# Patient Record
Sex: FEMALE | Race: WHITE | Employment: OTHER | URBAN - METROPOLITAN AREA
[De-identification: names, ages, dates, MRNs, and addresses within clinical notes are randomized per-mention and may not be internally consistent; named-entity substitution may affect disease eponyms.]

---

## 2022-03-18 PROBLEM — I49.3 SYMPTOMATIC PVCS: Status: ACTIVE | Noted: 2019-06-04

## 2022-03-19 PROBLEM — R07.9 CHEST PAIN AT REST: Status: ACTIVE | Noted: 2019-04-11

## 2022-03-19 PROBLEM — R06.00 DYSPNEA: Status: ACTIVE | Noted: 2019-04-11

## 2022-03-19 PROBLEM — E03.9 ACQUIRED HYPOTHYROIDISM: Status: ACTIVE | Noted: 2019-04-11

## 2022-03-19 PROBLEM — R00.2 PALPITATIONS: Status: ACTIVE | Noted: 2019-04-11

## 2023-09-11 NOTE — PROGRESS NOTES
hepatocellular carcinoma and adenocarcinoma. Patient was taken to your for resection of the left hepatic lobe mass but intraoperatively, ultrasound identified multiple lesions in the right lobe of the liver not previously known and therefore hepatectomy was aborted. Y90 ablation has been planned. PLAN:  I had a lengthy conversation with the patient and her  and reviewed pathogenesis, natural history, prognosis and management approach to locally advanced hepatocellular carcinoma. Patient is scheduled for her planning visit with IR at [facility] on 9/20/2023. CT chest on 8/2/2023 had shown no evidence of metastatic disease within the chest.  No acute or aggressive bony abnormality was noted on CT skin evidence of the chest abdomen and pelvis. Liver directed treatment with Y90 has been appropriately recommended. Patient and her  had questions about the possibility of liver transplant. She will be referred for transplant evaluation. All questions were answered to the best of my abilities. She will follow-up with IR at [facility] as scheduled. We shall remain available for any questions or concerns in the future. Total visit time 60 minutes. Yakelin Haddad MD  [facility]  [address]  Office : [phone]  Fax : [phone]    Elements of this note have been dictated using speech recognition software. As a result, errors of speech recognition may have occurred.

## 2023-09-11 NOTE — PROGRESS NOTES
NEW PATIENT INTAKE      Referral Diagnosis: Hepatocellular carcinoma - 2nd opinion    Referring Provider: Claudia Clark MD    Primary Care Provider: Claudia Clark MD    Presenting Symptoms: Sharp stabbing epigastric abdominal pain with radiation to back x 5 days and associated nausea and vomiting    Social/ Medical/ Surgical History: Updated in Epic    Family History of Cancer/ Hematology Disorders: Daughter with anemia and maternal aunt and uncle with colon cancer    Chronological History of Pertinent Events: Ms. Roberto Gutierrez is a 39-year-old white female with a new diagnosis of Hepatocellular carcinoma who presents to [facility] for a 2nd opinion. 7/9/23 - 7/12/23: Pt admitted after presenting to the ED at [facility] reporting sharp stabbing epigastric abdominal pain with radiation to back x 5 days and associated nausea and vomiting   -Labs on admission showed LFTs WNL except for AST elevated at 60; Acute hepatitis panel showed hep C antibody reactive; Tumor markers were significant for CEA 7.6, CA 19-9 40, and .0 (CE/Labs)  -CT A/P with IV contrast revealed interval development of a large mass in the left lobe of the liver lateral segment may be metastatic. It appears to distend the liver capsule and may be a pain source. There is also sigmoid wall thickening on this exam suspicious for possible primary neoplasm. Prior cholecystectomy. Colonic diverticulosis without diverticulitis. Prior hysterectomy without adnexal mass. Severe right neural foraminal narrowing at L5-S1 (CE/Other Results)    7/10/23: CT-guided core needle biopsy of a liver lesion with pathology revealing poorly differentiated carcinoma. Immunohistochemistry showed very patchy CK7, CK20, and Arginase reactivity in the setting of some nuclear SATB2 reactivity, diffuse Villin reactivity, and essentially no BerEp4 reactivity.   The tumor appeared somewhat hepatoid, but had immunohistochemical features in common with both hepatocellular carcinoma and

## 2023-09-12 ENCOUNTER — OFFICE VISIT (OUTPATIENT)
Dept: ONCOLOGY | Age: 63
End: 2023-09-12
Payer: MEDICARE

## 2023-09-12 VITALS
BODY MASS INDEX: 26.98 KG/M2 | HEART RATE: 106 BPM | TEMPERATURE: 98.2 F | RESPIRATION RATE: 18 BRPM | OXYGEN SATURATION: 95 % | WEIGHT: 152.3 LBS | DIASTOLIC BLOOD PRESSURE: 76 MMHG | HEIGHT: 63 IN | SYSTOLIC BLOOD PRESSURE: 115 MMHG

## 2023-09-12 DIAGNOSIS — C22.0 HEPATOCELLULAR CARCINOMA (HCC): Primary | ICD-10-CM

## 2023-09-12 PROCEDURE — G8419 CALC BMI OUT NRM PARAM NOF/U: HCPCS | Performed by: INTERNAL MEDICINE

## 2023-09-12 PROCEDURE — 1036F TOBACCO NON-USER: CPT | Performed by: INTERNAL MEDICINE

## 2023-09-12 PROCEDURE — 3017F COLORECTAL CA SCREEN DOC REV: CPT | Performed by: INTERNAL MEDICINE

## 2023-09-12 PROCEDURE — 99205 OFFICE O/P NEW HI 60 MIN: CPT | Performed by: INTERNAL MEDICINE

## 2023-09-12 PROCEDURE — G8427 DOCREV CUR MEDS BY ELIG CLIN: HCPCS | Performed by: INTERNAL MEDICINE

## 2023-09-12 RX ORDER — PANTOPRAZOLE SODIUM 40 MG/1
40 TABLET, DELAYED RELEASE ORAL 2 TIMES DAILY
Qty: 60 TABLET | Refills: 27 | COMMUNITY
Start: 2022-05-24 | End: 2024-08-25

## 2023-09-12 RX ORDER — AMOXICILLIN 250 MG
2 CAPSULE ORAL 2 TIMES DAILY PRN
COMMUNITY
Start: 2023-08-28 | End: 2023-09-27

## 2023-09-12 RX ORDER — LORAZEPAM 1 MG/1
1 TABLET ORAL 3 TIMES DAILY PRN
COMMUNITY
Start: 2023-09-05

## 2023-09-12 RX ORDER — LACTULOSE 10 G/15ML
SOLUTION ORAL
COMMUNITY
Start: 2023-08-28

## 2023-09-12 RX ORDER — ONDANSETRON 8 MG/1
8 TABLET, ORALLY DISINTEGRATING ORAL 3 TIMES DAILY
COMMUNITY
Start: 2023-09-05

## 2023-09-12 RX ORDER — AMITRIPTYLINE HYDROCHLORIDE 10 MG/1
10 TABLET, FILM COATED ORAL
COMMUNITY